# Patient Record
Sex: MALE | Race: BLACK OR AFRICAN AMERICAN | NOT HISPANIC OR LATINO | URBAN - METROPOLITAN AREA
[De-identification: names, ages, dates, MRNs, and addresses within clinical notes are randomized per-mention and may not be internally consistent; named-entity substitution may affect disease eponyms.]

---

## 2024-07-11 ENCOUNTER — OFFICE VISIT (OUTPATIENT)
Dept: FAMILY MEDICINE | Facility: CLINIC | Age: 34
End: 2024-07-11

## 2024-07-11 VITALS
OXYGEN SATURATION: 96 % | TEMPERATURE: 98 F | WEIGHT: 237.69 LBS | RESPIRATION RATE: 18 BRPM | HEIGHT: 72 IN | SYSTOLIC BLOOD PRESSURE: 138 MMHG | DIASTOLIC BLOOD PRESSURE: 88 MMHG | HEART RATE: 78 BPM | BODY MASS INDEX: 32.19 KG/M2

## 2024-07-11 DIAGNOSIS — Z00.00 ENCOUNTER FOR WELLNESS EXAMINATION: Primary | ICD-10-CM

## 2024-07-11 DIAGNOSIS — B00.9 HSV (HERPES SIMPLEX VIRUS) INFECTION: ICD-10-CM

## 2024-07-11 PROCEDURE — 99204 OFFICE O/P NEW MOD 45 MIN: CPT | Mod: PBBFAC,PN

## 2024-07-11 RX ORDER — VALACYCLOVIR HYDROCHLORIDE 500 MG/1
TABLET, FILM COATED ORAL
Qty: 6 TABLET | Refills: 2 | Status: SHIPPED | OUTPATIENT
Start: 2024-07-11

## 2024-07-11 NOTE — PROGRESS NOTES
Patient Name: Bernardo Galaviz     : 1990    MRN: [id number]     Subjective:     Patient ID: Bernardo Galaviz is a 33 y.o. male.    Chief Complaint:   Chief Complaint   Patient presents with    Establish Care     New patient. Establish care. 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 Requesting education. Concerned about dx. Wants to takes meds for him not to expose significant other. 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         HPI: 2024: Patient presents to clinic today to establish care, patient has no chronic medical conditions which they have ever been managed.  Patient does not take any daily medications that they need refilled. Patient denies chest pain, palpitations, and shortness of breath.  Patient denies fever, night sweats, chills, nausea, vomiting, diarrhea, constipation, weight loss, and changes in appetite.  Wellness labs (CBC, CMP, A1c, FLP, TSH, Free T4) ordered for patient today. They will return to clinic to review these labs.     Patient has new concerns over recent STI testing performed at external facility, patient has tested positive for HSV antibodies and blood.  Denies ever having any known outbreak or lesions. Inquiring about medications and protection.        ROS:       12 point review of systems conducted, negative except as stated in the history of present illness. See HPI for details.    History:     Past Medical History:   Diagnosis Date    Bell's palsy         Past Surgical History:   Procedure Laterality Date    ANKLE SURGERY Right     2019       Family History   Problem Relation Name Age of Onset    Hypertension Father          Social History     Tobacco Use    Smoking status: Never    Smokeless tobacco: Never   Substance and Sexual Activity    Alcohol use: Not Currently    Drug use: Not Currently     Types: Marijuana    Sexual activity: Yes     Partners: Female       Current Outpatient Medications   Medication Instructions    valACYclovir (VALTREX) 500 MG tablet Take one tablet 2 times  "daily for 3 days        Review of patient's allergies indicates:   Allergen Reactions    Penicillins Other (See Comments)       Objective:     Visit Vitals  /88 (BP Location: Left arm, Patient Position: Sitting)   Pulse 78   Temp 98.1 °F (36.7 °C) (Oral)   Resp 18   Ht 6' (1.829 m)   Wt 107.8 kg (237 lb 11.2 oz)   SpO2 96%   BMI 32.24 kg/m²       Physical Examination:     Physical Exam  Constitutional:       General: He is not in acute distress.     Appearance: Normal appearance. He is not ill-appearing.   Cardiovascular:      Rate and Rhythm: Normal rate and regular rhythm.      Heart sounds: Normal heart sounds.   Pulmonary:      Effort: Pulmonary effort is normal. No respiratory distress.      Breath sounds: Normal breath sounds.   Musculoskeletal:      Cervical back: Normal range of motion.   Skin:     General: Skin is warm and dry.   Neurological:      Mental Status: He is alert and oriented to person, place, and time.   Psychiatric:         Mood and Affect: Mood normal.         Behavior: Behavior normal.         Lab Results:     Chemistry:  Lab Results   Component Value Date     02/09/2021    K 3.6 02/09/2021    BUN 10.0 02/09/2021    CREATININE 1.02 02/09/2021    GLUCOSE 97 02/09/2021    CALCIUM 9.1 02/09/2021    ALKPHOS 80 02/09/2021    LABPROT 7.2 02/09/2021    ALBUMIN 3.9 02/09/2021    BILIDIR 0.1 02/09/2021    IBILI 0.20 02/09/2021     (H) 02/09/2021     (H) 02/09/2021    TSH 1.3309 02/09/2021        No results found for: "HGBA1C", "MICROALBCREA"     Hematology:  Lab Results   Component Value Date    WBC 8.8 02/09/2021    HGB 13.2 (L) 02/09/2021    HCT 38.4 (L) 02/09/2021     02/09/2021       Lipid Panel:  No results found for: "CHOL", "HDL", "LDL", "TRIG", "TOTALCHOLEST"     Urine:  Lab Results   Component Value Date    APPEARANCEUA Clear 02/09/2021    PROTEINUA Negative 02/09/2021    LEUKOCYTESUR Negative 02/09/2021    RBCUA 0-2 02/09/2021    WBCUA 0-2 02/09/2021    " BACTERIA None Seen 02/09/2021    SQEPUA 2-20 02/09/2021    HYALINECASTS 3-5 (A) 02/09/2021        Assessment:          ICD-10-CM ICD-9-CM   1. Encounter for wellness examination  Z00.00 V70.0   2. HSV (herpes simplex virus) infection  B00.9 054.9        Plan:     1. Encounter for wellness examination  -     TSH; Future; Expected date: 07/11/2024  -     T4, Free; Future; Expected date: 07/11/2024  -     Hemoglobin A1C; Future; Expected date: 07/11/2024  -     SYPHILIS ANTIBODY (WITH REFLEX RPR); Future; Expected date: 07/11/2024  -     Hepatitis Panel, Acute; Future; Expected date: 07/11/2024  -     Lipid Panel; Future; Expected date: 07/11/2024  -     CBC Auto Differential; Future; Expected date: 07/11/2024  -     Comprehensive Metabolic Panel; Future; Expected date: 07/11/2024  -     HIV 1/2 Ag/Ab (4th Gen); Future; Expected date: 07/11/2024  -     Vitamin D; Future; Expected date: 07/11/2024  -     Urinalysis, Reflex to Urine Culture; Future; Expected date: 07/11/2024    2. HSV (herpes simplex virus) infection  Assessment & Plan:  Keep the affected areas dry and clean.   Take medicines only as directed by your health care provider.   Do not have sexual contact during active infections. Genital herpes is contagious.   Practice safe sex. Latex condoms and female condoms may help to prevent the spread of the herpes virus.   Avoid rubbing or touching the blisters and sores. If you do touch the blister or sores:  ? Wash your hands thoroughly.  ? Do not touch your eyes afterward.        Orders:  -     valACYclovir (VALTREX) 500 MG tablet; Take one tablet 2 times daily for 3 days  Dispense: 6 tablet; Refill: 2  -     Cancel: HSV 1 & 2, IgG; Future; Expected date: 07/11/2024         Follow up in about 2 weeks (around 7/25/2024) for BP recheck, review labs.    Future Appointments   Date Time Provider Department Center   7/25/2024  1:30 PM Gail Soto NP [facility]        Gail Soto,  NP

## 2024-07-11 NOTE — ASSESSMENT & PLAN NOTE
Keep the affected areas dry and clean.   Take medicines only as directed by your health care provider.   Do not have sexual contact during active infections. Genital herpes is contagious.   Practice safe sex. Latex condoms and female condoms may help to prevent the spread of the herpes virus.   Avoid rubbing or touching the blisters and sores. If you do touch the blister or sores:  ? Wash your hands thoroughly.  ? Do not touch your eyes afterward.

## 2024-12-02 ENCOUNTER — TELEPHONE (OUTPATIENT)
Dept: FAMILY MEDICINE | Facility: CLINIC | Age: 34
End: 2024-12-02

## 2024-12-02 NOTE — TELEPHONE ENCOUNTER
----- Message from [employer] sent at 12/2/2024  2:01 PM CST -----  Regarding: med advice  Who Called: Bernardo Galaviz    Caller is requesting assistance/information from provider's office.    Symptoms (please be specific):    How long has patient had these symptoms:    List of preferred pharmacies on file (remove unneeded): [unfilled]  If different, enter pharmacy into here including location and phone number:       Preferred Method of Contact: Phone Call  Patient's Preferred Phone Number on File: [phone]   Best Call Back Number, if different:  Additional Information: pt is requesting a call back, states he's having a medical emergency but its non- life threatening and just needs advice on what to do. Pls advise

## 2024-12-02 NOTE — TELEPHONE ENCOUNTER
Called and spoke with patient, returning phone call. Verified . C/o HSV 2 outbreak. Is asking for Rx to be called in. States he is driving/working out of state. States has bump underarm also. ED/Urgent Care precautions given. Verbalized understanding.

## 2024-12-04 ENCOUNTER — TELEPHONE (OUTPATIENT)
Dept: FAMILY MEDICINE | Facility: CLINIC | Age: 34
End: 2024-12-04

## 2024-12-04 NOTE — TELEPHONE ENCOUNTER
Called and spoke with patient, returning phone call. Verified . States went to an ED/Urgent Care for previous symptoms discussed. [facility] [place] [facility] given Rx Clindamycin for bump under arm and Rx Valaciclovir for HSV2. States wanted to update our clinic. Aware to contact clinic with any questions or concerns.

## 2024-12-04 NOTE — TELEPHONE ENCOUNTER
----- Message from Rebecca sent at 12/3/2024 11:35 AM CST -----  .Who Called: Bernardo Galaviz    Caller is requesting assistance/information from provider's office.    Symptoms (please be specific): n/a   How long has patient had these symptoms:  n/a  List of preferred pharmacies on file (remove unneeded): [unfilled]  If different, enter pharmacy into here including location and phone number: n/a      Preferred Method of Contact: Phone Call    Patient's Preferred Phone Number on File: [phone]     Best Call Back Number, if different:    Additional Information: Pt is requesting a cb. Pt stated he went to ER last night with a situation (did not want to discuss) and would like to speak with a nurse regarding the matter. Pt is also wanting to know who will be be seeing now that Gail is no longer in office. Please advise, thank you.

## 2025-02-06 ENCOUNTER — TELEPHONE (OUTPATIENT)
Dept: FAMILY MEDICINE | Facility: CLINIC | Age: 35
End: 2025-02-06

## 2025-02-06 NOTE — TELEPHONE ENCOUNTER
----- Message from [employer] sent at 2/5/2025  1:30 PM CST -----  Regarding: Nusrat Mejia  .Who Called: Bernardo Galaviz    Caller is requesting assistance/information from provider's office.    Symptoms (please be specific): n/a   How long has patient had these symptoms:  n/a  List of preferred pharmacies on file (remove unneeded): [unfilled]  If different, enter pharmacy into here including location and phone number: n/a      Preferred Method of Contact: Phone Call  Patient's Preferred Phone Number on File: [phone] (secondary Phone  Best Call Back Number, if different:[phone]-Main number   Additional Information: Pt stated that he is requesting to speak with the nurse regarding HSV. Pt is asking to try second line first.  Please call to advise. Regarding questions.

## 2025-02-06 NOTE — TELEPHONE ENCOUNTER
Called and spoke with patient, returning phone call. Verified . Patient has questions regarding HSV. HSV education given to patient. Verbalized understanding. States doesn't want to expose his significant other. Patient seems to be taking all appropriate precautions when outbreaks occur. Patient to call with any questions or concerns.

## 2025-03-20 ENCOUNTER — TELEPHONE (OUTPATIENT)
Dept: FAMILY MEDICINE | Facility: CLINIC | Age: 35
End: 2025-03-20

## 2025-03-20 NOTE — TELEPHONE ENCOUNTER
----- Message from Nisha sent at 3/20/2025  9:44 AM CDT -----  .Who Called: Bernardo Adams is requesting assistance/information from provider's office.Symptoms (please be specific): na How long has patient had these symptoms:  naList of preferred pharmacies on file (remove unneeded): [unfilled]If different, enter pharmacy into here including location and phone number: naPreferred Method of Contact: Phone CallPatient's Preferred Phone Number on File: [phone] Best Call Back Number, if different:Additional Information: pt wants nurse to give him a call-did not wants to be specific

## 2025-03-20 NOTE — TELEPHONE ENCOUNTER
Called and spoke with patient, returning phone call. Verified . Patient reports having a difficult time with significant other and attempting to not expose her to his Dx HSV. Patient wants to share education on this Dx with her. Patient requesting education about HSV to be sent via patient portal message. Patient to call back with any questions or concerns.